# Patient Record
Sex: MALE | Race: WHITE | NOT HISPANIC OR LATINO | Employment: STUDENT | URBAN - METROPOLITAN AREA
[De-identification: names, ages, dates, MRNs, and addresses within clinical notes are randomized per-mention and may not be internally consistent; named-entity substitution may affect disease eponyms.]

---

## 2019-01-15 ENCOUNTER — OFFICE VISIT (OUTPATIENT)
Dept: OTOLARYNGOLOGY | Facility: CLINIC | Age: 24
End: 2019-01-15
Payer: COMMERCIAL

## 2019-01-15 VITALS — WEIGHT: 253.75 LBS | BODY MASS INDEX: 31.55 KG/M2 | HEIGHT: 75 IN

## 2019-01-15 DIAGNOSIS — M26.629 TMJ SYNDROME: ICD-10-CM

## 2019-01-15 DIAGNOSIS — M54.2 CERVICALGIA: Primary | ICD-10-CM

## 2019-01-15 PROCEDURE — 3008F PR BODY MASS INDEX (BMI) DOCUMENTED: ICD-10-PCS | Mod: CPTII,S$GLB,, | Performed by: OTOLARYNGOLOGY

## 2019-01-15 PROCEDURE — 99999 PR PBB SHADOW E&M-EST. PATIENT-LVL II: ICD-10-PCS | Mod: PBBFAC,,, | Performed by: OTOLARYNGOLOGY

## 2019-01-15 PROCEDURE — 99203 PR OFFICE/OUTPT VISIT, NEW, LEVL III, 30-44 MIN: ICD-10-PCS | Mod: S$GLB,,, | Performed by: OTOLARYNGOLOGY

## 2019-01-15 PROCEDURE — 99999 PR PBB SHADOW E&M-EST. PATIENT-LVL II: CPT | Mod: PBBFAC,,, | Performed by: OTOLARYNGOLOGY

## 2019-01-15 PROCEDURE — 99203 OFFICE O/P NEW LOW 30 MIN: CPT | Mod: S$GLB,,, | Performed by: OTOLARYNGOLOGY

## 2019-01-15 PROCEDURE — 3008F BODY MASS INDEX DOCD: CPT | Mod: CPTII,S$GLB,, | Performed by: OTOLARYNGOLOGY

## 2019-01-15 RX ORDER — LEVOTHYROXINE SODIUM 75 UG/1
75 TABLET ORAL
COMMUNITY

## 2019-01-15 NOTE — PROGRESS NOTES
Subjective:       Patient ID: Micheal Johnson is a 23 y.o. male.    Chief Complaint: Otalgia    Otalgia    There is pain in the left ear. The current episode started 1 to 4 weeks ago. The problem has been unchanged. The pain is mild. Associated symptoms include neck pain. Pertinent negatives include no abdominal pain, ear discharge, headaches, hearing loss, rash or sore throat. He has tried nothing for the symptoms. There is no history of a chronic ear infection or a tympanostomy tube.     Review of Systems   Constitutional: Negative for chills, fever and unexpected weight change.   HENT: Positive for ear pain. Negative for ear discharge, hearing loss, sore throat and trouble swallowing.    Eyes: Negative for pain and visual disturbance.   Respiratory: Negative for apnea and shortness of breath.    Cardiovascular: Negative for chest pain and palpitations.   Gastrointestinal: Negative for abdominal pain and nausea.   Endocrine: Negative for cold intolerance and heat intolerance.   Musculoskeletal: Positive for neck pain. Negative for joint swelling and neck stiffness.   Skin: Negative for color change and rash.   Neurological: Negative for facial asymmetry and headaches.   Hematological: Negative for adenopathy. Does not bruise/bleed easily.   Psychiatric/Behavioral: Negative for agitation. The patient is not nervous/anxious.        Objective:      Physical Exam   Constitutional: He is oriented to person, place, and time. He appears well-developed and well-nourished. No distress.   HENT:   Head: Normocephalic and atraumatic.   Right Ear: Tympanic membrane, external ear and ear canal normal.   Left Ear: Tympanic membrane, external ear and ear canal normal.   Nose: Nose normal.   Mouth/Throat: Uvula is midline, oropharynx is clear and moist and mucous membranes are normal.   Maher: Midline  Rinne: AC > BC @ 512Hz    Tenderness on palpation of left SCM at attachment to mastoid and left pterygoid   Eyes: Conjunctivae and  EOM are normal. Pupils are equal, round, and reactive to light.   Neck: Normal range of motion. No tracheal deviation present. No thyromegaly present.   Cardiovascular: Normal rate and regular rhythm.   Pulmonary/Chest: Effort normal. No respiratory distress.   Musculoskeletal: Normal range of motion.   Lymphadenopathy:        Head (right side): No submental, no submandibular and no tonsillar adenopathy present.        Head (left side): No submental, no submandibular and no tonsillar adenopathy present.     He has no cervical adenopathy.   Neurological: He is alert and oriented to person, place, and time.   Psychiatric: He has a normal mood and affect. His behavior is normal.   Nursing note and vitals reviewed.      Assessment:       1. Cervicalgia    2. TMJ syndrome        24 yo with left sided skull base pain appears to be muscular in origin. Discussed treatment including massage, warm compress, NSAIDs ROM exercises  Plan:         TMJ regimen with soft diet, NSAID's, Heating pad over joint for 20 minutes tid for 7-10 days. If no better consider re evaluation for use of muscle relaxants and or referral to Oral Surgery specialist.